# Patient Record
Sex: FEMALE | Race: WHITE | NOT HISPANIC OR LATINO | Employment: OTHER | URBAN - METROPOLITAN AREA
[De-identification: names, ages, dates, MRNs, and addresses within clinical notes are randomized per-mention and may not be internally consistent; named-entity substitution may affect disease eponyms.]

---

## 2017-03-08 ENCOUNTER — HISTORICAL (OUTPATIENT)
Dept: LAB | Facility: HOSPITAL | Age: 77
End: 2017-03-08

## 2017-04-18 ENCOUNTER — HISTORICAL (OUTPATIENT)
Dept: LAB | Facility: HOSPITAL | Age: 77
End: 2017-04-18

## 2019-12-23 ENCOUNTER — HISTORICAL (OUTPATIENT)
Dept: LAB | Facility: HOSPITAL | Age: 79
End: 2019-12-23

## 2019-12-24 LAB — GRAM STN SPEC: NORMAL

## 2021-08-16 ENCOUNTER — HISTORICAL (OUTPATIENT)
Dept: RADIOLOGY | Facility: HOSPITAL | Age: 81
End: 2021-08-16

## 2022-04-09 ENCOUNTER — HISTORICAL (OUTPATIENT)
Dept: ADMINISTRATIVE | Facility: HOSPITAL | Age: 82
End: 2022-04-09
Payer: MEDICARE

## 2022-04-25 VITALS
HEIGHT: 63 IN | BODY MASS INDEX: 27.77 KG/M2 | WEIGHT: 156.75 LBS | SYSTOLIC BLOOD PRESSURE: 90 MMHG | DIASTOLIC BLOOD PRESSURE: 54 MMHG

## 2022-08-15 ENCOUNTER — LAB REQUISITION (OUTPATIENT)
Dept: LAB | Facility: HOSPITAL | Age: 82
End: 2022-08-15
Payer: MEDICARE

## 2022-08-15 DIAGNOSIS — J32.1 CHRONIC FRONTAL SINUSITIS: ICD-10-CM

## 2022-08-15 PROCEDURE — 87070 CULTURE OTHR SPECIMN AEROBIC: CPT | Performed by: OTOLARYNGOLOGY

## 2022-08-15 PROCEDURE — 87205 SMEAR GRAM STAIN: CPT | Performed by: OTOLARYNGOLOGY

## 2022-08-16 LAB
GRAM STN SPEC: ABNORMAL

## 2022-08-18 LAB
BACTERIA SPEC CULT: ABNORMAL
BACTERIA SPEC CULT: ABNORMAL

## 2023-01-24 PROBLEM — G47.33 OSA ON CPAP: Chronic | Status: ACTIVE | Noted: 2023-01-24

## 2023-01-24 PROBLEM — G47.33 OSA ON CPAP: Status: ACTIVE | Noted: 2023-01-24

## 2023-01-24 PROBLEM — G62.9 SMALL FIBER NEUROPATHY: Status: ACTIVE | Noted: 2023-01-24

## 2023-01-24 PROBLEM — G62.9 SMALL FIBER NEUROPATHY: Chronic | Status: ACTIVE | Noted: 2023-01-24

## 2023-01-24 PROBLEM — R41.3 MEMORY LOSS: Status: ACTIVE | Noted: 2023-01-24

## 2023-01-27 PROBLEM — R41.3 MEMORY LOSS: Chronic | Status: ACTIVE | Noted: 2023-01-24

## 2023-01-27 NOTE — PROGRESS NOTES
Subjective:       Patient ID: Alysia Rivera is a 82 y.o. female.    Chief Complaint: Peripheral Neuropathy (Denies falls) and JUAN on CPAP    HPI  Juan on cpap  Needs new mask  Avg >4h/night    Memory loss:  On aricept 10/day & namenda 10mg bid  Things are about the same  Behavior is good    Small fiber neuropathy:  On gabapentin 300mg bid & cmpd cream works   She uses CMPD cream at bedtime  Things are the same  No falls    Here with her  today    Review of Systems   A 14pt ROS was reviewed & is negative unless otherwise documented in the HPI    Objective:    Physical Exam    GENERAL: NAD, calm, cooperative, appropriate  Awake/alert  Well groomed  RESP: CTAB  HEART: RRR  No LE edema  MENTAL STATUS: oriented, follow commands reliably  SPEECH/LANGUAGE: clear, fluent  CN:  Perrla, eomi, vff, gaze conjugate  No tactile or motor facial asymmetry  Tongue protrudes midline  Motor: no focal weakness  Cerebellar: no tremor or dysmetria  Sensory: normal to tactile stim/vibration  DTRs: normal +2, symmetric  Gait: steady    I, maylin obrien np, certify that dr. Ezra mendez the supervising/rendering physician is physically present in the office at the time of the visit    Assessment:       Problem List Items Addressed This Visit          Neuro    Memory loss (Chronic)    Relevant Medications    donepeziL (ARICEPT) 10 MG tablet    memantine (NAMENDA) 10 MG Tab    Small fiber neuropathy (Chronic)    Relevant Medications    gabapentin (NEURONTIN) 300 MG capsule       Other    JUAN on CPAP - Primary (Chronic)    Relevant Orders    CPAP/BIPAP SUPPLIES       Plan:       Cont aricept 10/day & namenda 10mg bid  Cont cpap; compliant/benefiting/medically necessary  Uses HMS  Cont gabapentin 300mg bid & cmpd cream  F/u 1y

## 2023-01-30 ENCOUNTER — OFFICE VISIT (OUTPATIENT)
Dept: NEUROLOGY | Facility: CLINIC | Age: 83
End: 2023-01-30
Payer: MEDICARE

## 2023-01-30 VITALS
BODY MASS INDEX: 27.46 KG/M2 | SYSTOLIC BLOOD PRESSURE: 102 MMHG | DIASTOLIC BLOOD PRESSURE: 68 MMHG | HEIGHT: 63 IN | WEIGHT: 155 LBS

## 2023-01-30 DIAGNOSIS — G47.33 OSA ON CPAP: Primary | Chronic | ICD-10-CM

## 2023-01-30 DIAGNOSIS — G62.9 SMALL FIBER NEUROPATHY: ICD-10-CM

## 2023-01-30 DIAGNOSIS — R41.3 MEMORY LOSS: ICD-10-CM

## 2023-01-30 PROCEDURE — 99214 OFFICE O/P EST MOD 30 MIN: CPT | Mod: PBBFAC | Performed by: NURSE PRACTITIONER

## 2023-01-30 PROCEDURE — 99999 PR PBB SHADOW E&M-EST. PATIENT-LVL IV: ICD-10-PCS | Mod: PBBFAC,,, | Performed by: NURSE PRACTITIONER

## 2023-01-30 PROCEDURE — 99214 PR OFFICE/OUTPT VISIT, EST, LEVL IV, 30-39 MIN: ICD-10-PCS | Mod: S$PBB,,, | Performed by: NURSE PRACTITIONER

## 2023-01-30 PROCEDURE — 99214 OFFICE O/P EST MOD 30 MIN: CPT | Mod: S$PBB,,, | Performed by: NURSE PRACTITIONER

## 2023-01-30 PROCEDURE — 99999 PR PBB SHADOW E&M-EST. PATIENT-LVL IV: CPT | Mod: PBBFAC,,, | Performed by: NURSE PRACTITIONER

## 2023-01-30 RX ORDER — FLUOXETINE HYDROCHLORIDE 20 MG/1
20 CAPSULE ORAL DAILY
COMMUNITY
Start: 2022-12-10

## 2023-01-30 RX ORDER — GABAPENTIN 300 MG/1
600 CAPSULE ORAL 2 TIMES DAILY
COMMUNITY
Start: 2022-11-08 | End: 2023-01-30 | Stop reason: SDUPTHER

## 2023-01-30 RX ORDER — DONEPEZIL HYDROCHLORIDE 10 MG/1
10 TABLET, FILM COATED ORAL DAILY
COMMUNITY
Start: 2022-11-08 | End: 2023-01-30 | Stop reason: SDUPTHER

## 2023-01-30 RX ORDER — DONEPEZIL HYDROCHLORIDE 10 MG/1
10 TABLET, FILM COATED ORAL DAILY
Qty: 90 TABLET | Refills: 4 | Status: SHIPPED | OUTPATIENT
Start: 2023-01-30 | End: 2024-01-29

## 2023-01-30 RX ORDER — MEMANTINE HYDROCHLORIDE 10 MG/1
10 TABLET ORAL 2 TIMES DAILY
COMMUNITY
Start: 2022-11-12 | End: 2023-01-30 | Stop reason: SDUPTHER

## 2023-01-30 RX ORDER — HYDROXYCHLOROQUINE SULFATE 200 MG/1
200 TABLET, FILM COATED ORAL DAILY
COMMUNITY
Start: 2022-12-06

## 2023-01-30 RX ORDER — GABAPENTIN 300 MG/1
600 CAPSULE ORAL 2 TIMES DAILY
Qty: 360 CAPSULE | Refills: 4 | Status: SHIPPED | OUTPATIENT
Start: 2023-01-30 | End: 2024-01-29 | Stop reason: SDUPTHER

## 2023-01-30 RX ORDER — TOLTERODINE TARTRATE 4 MG/1
4 CAPSULE, EXTENDED RELEASE ORAL DAILY
COMMUNITY
Start: 2023-01-13

## 2023-01-30 RX ORDER — SIMVASTATIN 40 MG/1
40 TABLET, FILM COATED ORAL NIGHTLY
COMMUNITY
Start: 2023-01-02

## 2023-01-30 RX ORDER — LEVOTHYROXINE SODIUM 75 UG/1
75 TABLET ORAL DAILY
COMMUNITY
Start: 2023-01-22

## 2023-01-30 RX ORDER — OMEPRAZOLE 40 MG/1
40 CAPSULE, DELAYED RELEASE ORAL DAILY
COMMUNITY
Start: 2023-01-25

## 2023-01-30 RX ORDER — GUAIFENESIN 1200 MG
500 TABLET, EXTENDED RELEASE 12 HR ORAL EVERY 4 HOURS PRN
COMMUNITY

## 2023-01-30 RX ORDER — MEMANTINE HYDROCHLORIDE 10 MG/1
10 TABLET ORAL 2 TIMES DAILY
Qty: 180 TABLET | Refills: 4 | Status: SHIPPED | OUTPATIENT
Start: 2023-01-30 | End: 2024-01-29 | Stop reason: SDUPTHER

## 2023-01-30 RX ORDER — PHENYLEPHRINE HCL 10 MG/1
10 TABLET, FILM COATED ORAL EVERY 4 HOURS PRN
COMMUNITY

## 2023-01-30 RX ORDER — CLOBETASOL PROPIONATE 0.46 MG/ML
1 SOLUTION TOPICAL DAILY PRN
COMMUNITY

## 2023-01-30 RX ORDER — CETIRIZINE HYDROCHLORIDE 10 MG/1
10 TABLET ORAL DAILY
COMMUNITY

## 2023-01-30 NOTE — PATIENT INSTRUCTIONS
"Patient Education       Dementia (Including Alzheimer Disease)   The Basics   Written by the doctors and editors at [facility]   What is dementia? -- Dementia is the general term for a group of brain disorders that cause memory problems and make it hard to think clearly (figure 1).  What symptoms does dementia cause? -- The symptoms of dementia often start off very mild and get worse slowly. Symptoms can include:  Forgetfulness  Acting confused or disoriented  Trouble with speech and writing (for example, not being able to find the right words for things)  Trouble concentrating and reasoning  Problems with tasks such as paying bills or balancing a checkbook  Getting lost in familiar places  As dementia gets worse, people might:  Have episodes of anger or aggression  See things that aren't there or believe things that aren't true  Be unable to eat, bathe, dress, or do other everyday tasks  Lose bladder and bowel control  What are the different kinds of dementia? -- The most common kinds include:  Alzheimer disease - Alzheimer disease is the most common cause of dementia. It is a disorder in which brain cells slowly die over time.  Vascular dementia - Vascular dementia happens when parts of the brain do not get enough blood. This can happen when blood vessels in the brain get blocked with blood clots or damaged by high blood pressure or aging. This form of dementia is most common among people who have had strokes or who are at risk for strokes.  Parkinson disease dementia - Parkinson disease is a brain disorder that affects movement. It causes trembling, stiffness, and slowness. As Parkinson disease gets worse, some people develop dementia. "Lewy body dementia" is a related form of dementia.   Other causes of dementia - Dementia can also happen if a person's brain has been damaged. For example, having many head injuries can lead to dementia.  Should I see a doctor or nurse? -- Yes, you should see a doctor or nurse if " you think you or someone close to you is showing signs of dementia. Sometimes memory loss and confusion are caused by medical problems other than dementia that can be treated. For example, people with diabetes sometimes show signs of confusion when their blood sugar is not well controlled.  Are there tests I should have? -- Your doctor or nurse will decide which tests you should have based on your individual situation. Many people with signs of dementia do not need a brain scan. That's because the tests that are most useful are the ones that look at how you answer questions and do certain tasks. Even so, your doctor might want to do a brain scan (either CT or MRI) to make sure that your symptoms are not caused by a problem unrelated to dementia.  How is dementia treated? -- That depends on what kind of dementia you have. If you have Alzheimer disease, there are medicines that might help some. If you have vascular dementia, your doctor will focus on keeping your blood pressure and cholesterol as close to normal as possible. Doing that can help reduce further damage to the brain.  Sadly, there really aren't good treatments for most types of dementia. But doctors can sometimes treat troubling symptoms that come with dementia, such as depression or anxiety.  How do I stay safe? -- If you have dementia, you might not be aware of how much your condition affects you. Trust your family and friends to tell you when it is no longer safe for you to drive, cook, or do other things that could be dangerous.  Be aware, too, that people with dementia often fall and hurt themselves. To reduce the risk of falls, it's a good idea to:  Secure loose rugs or use non-skid backing on rugs  Tuck away loose wires or electrical cords  Wear sturdy, comfortable shoes  Keep walkways well lit  Can dementia be prevented? -- There are no proven ways to prevent dementia. But here are some things that seem to help keep the brain healthy:  Physical  activity  A healthy diet  Social interaction  All topics are updated as new evidence becomes available and our peer review process is complete.  This topic retrieved from [employer] on: Sep 21, 2021.  Topic 43643 Version 15.0  Release: 29.4.2 - C29.263  © 2021 UpToDate, Inc. and/or its affiliates. All rights reserved.  figure 1: Dementia caused by changes in the brain     Different forms of dementia are linked to changes in the brain. Alzheimer disease causes many parts of the brain to shrink. Parkinson disease damages parts of the brain involved in movement. Vascular dementia happens when the blood vessels that supply the brain are diseased.  Graphic 84972 Version 2.0    Consumer Information Use and Disclaimer   This information is not specific medical advice and does not replace information you receive from your health care provider. This is only a brief summary of general information. It does NOT include all information about conditions, illnesses, injuries, tests, procedures, treatments, therapies, discharge instructions or life-style choices that may apply to you. You must talk with your health care provider for complete information about your health and treatment options. This information should not be used to decide whether or not to accept your health care provider's advice, instructions or recommendations. Only your health care provider has the knowledge and training to provide advice that is right for you. The use of this information is governed by the [employer] End User License Agreement, available at https://[email or web address].com.MeshApp/en/[email or web address]/about/hunter.The use of [employer] content is governed by the [employer] Terms of Use. ©2021 UpToDate, Inc. All rights reserved.  Copyright   © 2021 UpToDate, Inc. and/or its affiliates. All rights reserved.

## 2023-03-07 ENCOUNTER — TELEPHONE (OUTPATIENT)
Dept: NEUROLOGY | Facility: CLINIC | Age: 83
End: 2023-03-07
Payer: MEDICARE

## 2023-03-07 NOTE — TELEPHONE ENCOUNTER
States pt's prescription has  and a new one is needed.  States rx can be called or faxed in.   Fax: [phone]    Medication: Meloxicam 0.5%, Gabapentin 6 %, Lidocaine 2%, Prilocaine 2%    Pharmacy: [facility]    Last Appointment: 23    Next Appointment: 24

## 2024-01-29 ENCOUNTER — OFFICE VISIT (OUTPATIENT)
Dept: NEUROLOGY | Facility: CLINIC | Age: 84
End: 2024-01-29
Payer: MEDICARE

## 2024-01-29 VITALS
BODY MASS INDEX: 27.46 KG/M2 | WEIGHT: 155 LBS | SYSTOLIC BLOOD PRESSURE: 110 MMHG | HEIGHT: 63 IN | DIASTOLIC BLOOD PRESSURE: 60 MMHG

## 2024-01-29 DIAGNOSIS — F02.80 DEMENTIA ASSOCIATED WITH NORMAL PRESSURE HYDROCEPHALUS: Primary | ICD-10-CM

## 2024-01-29 DIAGNOSIS — R41.3 MEMORY LOSS: ICD-10-CM

## 2024-01-29 DIAGNOSIS — G91.2 DEMENTIA ASSOCIATED WITH NORMAL PRESSURE HYDROCEPHALUS: Primary | ICD-10-CM

## 2024-01-29 DIAGNOSIS — G62.9 SMALL FIBER NEUROPATHY: ICD-10-CM

## 2024-01-29 PROCEDURE — 99214 OFFICE O/P EST MOD 30 MIN: CPT | Mod: S$PBB,,, | Performed by: PSYCHIATRY & NEUROLOGY

## 2024-01-29 PROCEDURE — 99999 PR PBB SHADOW E&M-EST. PATIENT-LVL III: CPT | Mod: PBBFAC,,, | Performed by: PSYCHIATRY & NEUROLOGY

## 2024-01-29 PROCEDURE — 99213 OFFICE O/P EST LOW 20 MIN: CPT | Mod: PBBFAC | Performed by: PSYCHIATRY & NEUROLOGY

## 2024-01-29 RX ORDER — LORATADINE 10 MG
10 TABLET,DISINTEGRATING ORAL DAILY
COMMUNITY

## 2024-01-29 RX ORDER — GABAPENTIN 300 MG/1
600 CAPSULE ORAL 2 TIMES DAILY
Qty: 360 CAPSULE | Refills: 4 | Status: SHIPPED | OUTPATIENT
Start: 2024-01-29 | End: 2025-04-23

## 2024-01-29 RX ORDER — MEMANTINE HYDROCHLORIDE 10 MG/1
10 TABLET ORAL 2 TIMES DAILY
Qty: 180 TABLET | Refills: 4 | Status: SHIPPED | OUTPATIENT
Start: 2024-01-29 | End: 2025-04-23

## 2024-01-29 RX ORDER — RIVASTIGMINE 13.3 MG/24H
1 PATCH, EXTENDED RELEASE TRANSDERMAL DAILY
Qty: 90 PATCH | Refills: 4 | Status: SHIPPED | OUTPATIENT
Start: 2024-01-29

## 2024-01-29 NOTE — PROGRESS NOTES
"NEUROLOGY  OUTPATIENT OFFICE VISIT NOTE    SUBJECTIVE:      Chief Complaint: Peripheral Neuropathy, ASHKAN on CPAP, and Memory Loss       HPI: Alysia Rivera is a 83 y.o. yo female who  has a past medical history of Memory loss, Neuropathy, NPH (normal pressure hydrocephalus), and ASHKAN on CPAP., who presents for  annual follow up in neurology clinic.  Presents with her  today.    ASHKAN on CPAP:  Compliant, tolerating well.    Memory Loss:  Currently on Aricept and namenda,  reporting worsening long term memory, poor short term memory  Behavior is good  No headaches, no migrains, no syncope    Small Fiber Neuropathy:  Continues on gabapentin 600 mg BID, CMPD cream  Symptoms stable from before, having some sensation of parasthesias and cold feeling in her feet  Report no falls    Past Medical History:   has a past medical history of Memory loss, Neuropathy, NPH (normal pressure hydrocephalus), and ASHKAN on CPAP.     Past Surgical History:   has a past surgical history that includes Functional endoscopic sinus surgery (FESS); Cataract extraction w/  intraocular lens implant (Bilateral); Cholecystectomy; Hysterectomy; kyphoplasty; and Colonoscopy.     Family History:  family history includes Alzheimer's disease in her mother; Cancer in her father; Psychosis in her sister.     Social History:   reports that she has quit smoking. Her smoking use included cigarettes. She has quit using smokeless tobacco. She reports current alcohol use. She reports that she does not use drugs.     Allergies:  is allergic to omnicef [cefdinir] and pcn [penicillins].     OBJECTIVE:     Vital signs:   /60 (BP Location: Right arm, Patient Position: Sitting, BP Method: Medium (Manual))   Ht 5' 3" (1.6 m)   Wt 70.3 kg (155 lb)   BMI 27.46 kg/m²      Physical Examination:  General appearance: alert, appears stated age, cooperative and no distress  Head: Normocephalic, without obvious abnormality, atraumatic  Neck: no adenopathy, " no carotid bruit, no JVD and supple, symmetrical, trachea midline  Lungs: clear to auscultation bilaterally  Heart: regular rate and rhythm, S1, S2 normal, no murmur, click, rub or gallop  Abdomen: soft, non-tender; bowel sounds normal; no masses,  no organomegaly  Extremities: extremities normal, atraumatic, no cyanosis or edema  Pulses: 2+ and symmetric  Skin: Skin color, texture, turgor normal. No rashes or lesions  Neurologic:    CN II: Intact visual acuity as well as pupillary light reflex   CN III, IV, VI: Intact extraocular movements B/L   CN V: Facial sensation intact to gross and fine touch in B/L V1-V3 segments   CN VII: Patient able to grimace, tightly clenched eyes, puff out cheeks against resistance   CN VIII: Normal hearing bilaterally, no tinnitus/vertigo reported    CN IX & X: Gag reflex deferred; Uvula midline   CN XI: Patient able to shrug shoulders and turn head to both sides normally   CN XII: Intact glossal movements as well as protrusion   Cerebellar: no dysdiadochokinesia, N rapid alternating movements, N finger-to-nose test; Romberg test & Tandem Walking     Gait: stable gait      ASSESSMENT & PLAN:      Small Fiber Neuropathy  - continue Gabapentin 600 mg BID   - CMPD cream  - symptoms stable from before    Memory Loss  - Chronic with some worstening this visit  - continue Namenda 10 mg  - will discontinue from Aricept 10 mg, begin rivastigmine patch q24 hours    ASHKAN on CPAP  - compliant with CPAP, good effect  - continue        Return to clinic in 1 year(s).    Reshma Allison DO  [facility], [facility]  01/29/2024    Orders Placed This Encounter    rivastigmine 13.3 mg/24 hour PT24    memantine (NAMENDA) 10 MG Tab    gabapentin (NEURONTIN) 300 MG capsule

## 2024-05-20 ENCOUNTER — TELEPHONE (OUTPATIENT)
Dept: NEUROLOGY | Facility: CLINIC | Age: 84
End: 2024-05-20
Payer: MEDICARE

## 2024-05-20 DIAGNOSIS — G47.33 OSA ON CPAP: Primary | ICD-10-CM

## 2024-06-10 ENCOUNTER — TELEPHONE (OUTPATIENT)
Dept: NEUROLOGY | Facility: CLINIC | Age: 84
End: 2024-06-10
Payer: MEDICARE

## 2025-01-27 ENCOUNTER — OFFICE VISIT (OUTPATIENT)
Dept: NEUROLOGY | Facility: CLINIC | Age: 85
End: 2025-01-27
Payer: MEDICARE

## 2025-01-27 VITALS
HEIGHT: 63 IN | DIASTOLIC BLOOD PRESSURE: 64 MMHG | SYSTOLIC BLOOD PRESSURE: 110 MMHG | BODY MASS INDEX: 26.58 KG/M2 | WEIGHT: 150 LBS

## 2025-01-27 DIAGNOSIS — G62.9 SMALL FIBER NEUROPATHY: ICD-10-CM

## 2025-01-27 DIAGNOSIS — F02.80 DEMENTIA ASSOCIATED WITH NORMAL PRESSURE HYDROCEPHALUS: ICD-10-CM

## 2025-01-27 DIAGNOSIS — G91.2 DEMENTIA ASSOCIATED WITH NORMAL PRESSURE HYDROCEPHALUS: ICD-10-CM

## 2025-01-27 DIAGNOSIS — R41.3 MEMORY LOSS: ICD-10-CM

## 2025-01-27 PROCEDURE — 99213 OFFICE O/P EST LOW 20 MIN: CPT | Mod: PBBFAC | Performed by: PSYCHIATRY & NEUROLOGY

## 2025-01-27 PROCEDURE — 99999 PR PBB SHADOW E&M-EST. PATIENT-LVL III: CPT | Mod: PBBFAC,,, | Performed by: PSYCHIATRY & NEUROLOGY

## 2025-01-27 PROCEDURE — 99214 OFFICE O/P EST MOD 30 MIN: CPT | Mod: S$PBB,,, | Performed by: PSYCHIATRY & NEUROLOGY

## 2025-01-27 RX ORDER — MEMANTINE HYDROCHLORIDE 10 MG/1
10 TABLET ORAL 2 TIMES DAILY
Qty: 180 TABLET | Refills: 4 | Status: SHIPPED | OUTPATIENT
Start: 2025-01-27 | End: 2026-04-22

## 2025-01-27 RX ORDER — RIVASTIGMINE 13.3 MG/24H
1 PATCH, EXTENDED RELEASE TRANSDERMAL DAILY
Qty: 90 PATCH | Refills: 4 | Status: SHIPPED | OUTPATIENT
Start: 2025-01-27

## 2025-01-27 RX ORDER — DONEPEZIL HYDROCHLORIDE 10 MG/1
10 TABLET, FILM COATED ORAL NIGHTLY
COMMUNITY
End: 2025-01-27

## 2025-01-27 RX ORDER — GABAPENTIN 300 MG/1
600 CAPSULE ORAL 3 TIMES DAILY
Qty: 540 CAPSULE | Refills: 4 | Status: SHIPPED | OUTPATIENT
Start: 2025-01-27 | End: 2026-04-22

## 2025-01-27 RX ORDER — FAMOTIDINE 20 MG/1
20 TABLET, FILM COATED ORAL 2 TIMES DAILY
COMMUNITY

## 2025-01-27 RX ORDER — LINACLOTIDE 72 UG/1
72 CAPSULE, GELATIN COATED ORAL EVERY MORNING
COMMUNITY
Start: 2025-01-02

## 2025-01-27 NOTE — PROGRESS NOTES
NEUROLOGY  OUTPATIENT OFFICE VISIT NOTE    SUBJECTIVE:      Chief Complaint: Peripheral Neuropathy, Memory Loss, and ASHKAN on CPAP       HPI: Alysia Rivera is a 84 y.o. yo female who  has a past medical history of Memory loss, Neuropathy, NPH (normal pressure hydrocephalus), and ASHKAN on CPAP., who presents for  annual follow up in neurology clinic.  ***        Past Medical History:   has a past medical history of Memory loss, Neuropathy, NPH (normal pressure hydrocephalus), and ASHKAN on CPAP.     Past Surgical History:   has a past surgical history that includes Functional endoscopic sinus surgery (FESS); Cataract extraction w/  intraocular lens implant (Bilateral); Cholecystectomy; Hysterectomy; kyphoplasty; and Colonoscopy.     Family History:  family history includes Alzheimer's disease in her mother; Cancer in her father; Psychosis in her sister.     Social History:   reports that she has quit smoking. Her smoking use included cigarettes. She has quit using smokeless tobacco. She reports current alcohol use. She reports that she does not use drugs.     Allergies:  is allergic to omnicef [cefdinir] and pcn [penicillins].     OBJECTIVE:     Vital signs:   There were no vitals taken for this visit.     Physical Examination:  General appearance: alert, appears stated age, cooperative and no distress  Head: Normocephalic, without obvious abnormality, atraumatic  Neck: no adenopathy, no carotid bruit, no JVD and supple, symmetrical, trachea midline  Lungs: clear to auscultation bilaterally  Heart: regular rate and rhythm, S1, S2 normal, no murmur, click, rub or gallop  Abdomen: soft, non-tender; bowel sounds normal; no masses,  no organomegaly  Extremities: extremities normal, atraumatic, no cyanosis or edema  Pulses: 2+ and symmetric  Skin: Skin color, texture, turgor normal. No rashes or lesions  Neurologic:      ASSESSMENT & PLAN:      Small Fiber Neuropathy  - continue Gabapentin 600 mg BID   - CMPD cream  -  symptoms stable from before    Memory Loss  - Chronic with some worstening this visit  - continue Namenda 10 mg  - will discontinue from Aricept 10 mg, begin rivastigmine patch q24 hours    ASHKAN on CPAP  - compliant with CPAP, good effect  - continue      Return to clinic in *** year(s).    Tony Chin MD  [facility], [facility]  01/27/2025

## 2025-01-27 NOTE — PROGRESS NOTES
"  NEUROLOGY  OUTPATIENT OFFICE VISIT NOTE    SUBJECTIVE:      Chief Complaint: Peripheral Neuropathy, Memory Loss, and ASHKAN on CPAP       HPI: Alysia Rivera is a 84 y.o. yo female who  has a past medical history of Memory loss, Neuropathy, NPH (normal pressure hydrocephalus), and ASHKAN on CPAP., who presents for  annual follow up in neurology clinic.  Presents with her  today. Patient has been well since her last appointment. She reports some mild worsening of her lower extremity neuropathy with pain and cold sensation despite treatment though it does not limit her mobility. She has fallen once at home but otherwise uses walker at home for mobility.  reports that patient's memory has slightly worsened though reports compliance with medication therapy. They reports compliance with treatment with rarely missed doses.       Past Medical History:   has a past medical history of Memory loss, Neuropathy, NPH (normal pressure hydrocephalus), and ASHKAN on CPAP.     Past Surgical History:   has a past surgical history that includes Functional endoscopic sinus surgery (FESS); Cataract extraction w/  intraocular lens implant (Bilateral); Cholecystectomy; Hysterectomy; kyphoplasty; and Colonoscopy.     Family History:  family history includes Alzheimer's disease in her mother; Cancer in her father; Psychosis in her sister.     Social History:   reports that she has quit smoking. Her smoking use included cigarettes. She has quit using smokeless tobacco. She reports current alcohol use. She reports that she does not use drugs.     Allergies:  is allergic to omnicef [cefdinir] and pcn [penicillins].     OBJECTIVE:     Vital signs:   /64 (BP Location: Left arm, Patient Position: Sitting)   Ht 5' 3" (1.6 m)   Wt 68 kg (150 lb)   BMI 26.57 kg/m²      Physical Examination:  General appearance: alert, appears stated age, cooperative and no distress  Head: Normocephalic, without obvious abnormality, " atraumatic  Neck: no adenopathy, no carotid bruit, no JVD and supple, symmetrical, trachea midline  Lungs: clear to auscultation bilaterally  Heart: regular rate and rhythm, S1, S2 normal, no murmur, click, rub or gallop  Abdomen: soft, non-tender; bowel sounds normal; no masses,  no organomegaly  Extremities: extremities normal, atraumatic, no cyanosis or edema  Pulses: 2+ and symmetric  Skin: Skin color, texture, turgor normal. No rashes or lesions  Neurologic: bilateral lower extremity pain      ASSESSMENT & PLAN:      Small Fiber Neuropathy  - increase Gabapentin 600 mg TID   - CMPD cream  - follow up in 3 months; consider adding Cymbalta if no improvement in pain    Memory Loss  - Chronic with some worstening this visit  - continue Namenda 10 mg  - continue rivastigmine patch q24 hours; discontinued Aricept    ASHKAN on CPAP  - compliant with CPAP, good effect  - continue use        Return to clinic in 3 months.    Tony Chin MD  [facility]  01/27/2025    Orders Placed This Encounter    gabapentin (NEURONTIN) 300 MG capsule    memantine (NAMENDA) 10 MG Tab    rivastigmine 13.3 mg/24 hour PT24

## 2025-05-06 ENCOUNTER — OFFICE VISIT (OUTPATIENT)
Dept: NEUROLOGY | Facility: CLINIC | Age: 85
End: 2025-05-06
Payer: MEDICARE

## 2025-05-06 VITALS
SYSTOLIC BLOOD PRESSURE: 92 MMHG | BODY MASS INDEX: 26.58 KG/M2 | DIASTOLIC BLOOD PRESSURE: 66 MMHG | WEIGHT: 150 LBS | HEIGHT: 63 IN

## 2025-05-06 DIAGNOSIS — G62.9 SMALL FIBER NEUROPATHY: ICD-10-CM

## 2025-05-06 DIAGNOSIS — F02.80 DEMENTIA ASSOCIATED WITH NORMAL PRESSURE HYDROCEPHALUS: ICD-10-CM

## 2025-05-06 DIAGNOSIS — G91.2 DEMENTIA ASSOCIATED WITH NORMAL PRESSURE HYDROCEPHALUS: ICD-10-CM

## 2025-05-06 DIAGNOSIS — G47.33 OSA ON CPAP: Primary | ICD-10-CM

## 2025-05-06 PROCEDURE — 99214 OFFICE O/P EST MOD 30 MIN: CPT | Mod: S$PBB,,, | Performed by: PSYCHIATRY & NEUROLOGY

## 2025-05-06 PROCEDURE — 99214 OFFICE O/P EST MOD 30 MIN: CPT | Mod: PBBFAC | Performed by: PSYCHIATRY & NEUROLOGY

## 2025-05-06 PROCEDURE — 99999 PR PBB SHADOW E&M-EST. PATIENT-LVL IV: CPT | Mod: PBBFAC,,, | Performed by: PSYCHIATRY & NEUROLOGY

## 2025-05-06 RX ORDER — NITROFURANTOIN 25; 75 MG/1; MG/1
100 CAPSULE ORAL EVERY 12 HOURS
COMMUNITY
Start: 2025-05-02

## 2025-05-06 NOTE — PROGRESS NOTES
Subjective     Patient ID: Alysia Rivera is a 84 y.o. female.    Chief Complaint: Peripheral Neuropathy    HPI  Memory progressing; on exelon and memantine  Neuorpathy; better with gabapentin increase last time; c/o constant feet being cold  Also uses compound pain cream  Juan: compliant/benefitting from cpap  Review of Systems  The remainder of the 14 system ROS is noncontributory or negative unless mentioned/reviewed above.       Objective     Physical Exam  A.nfex       Assessment and Plan     1. JUAN on CPAP  -     CPAP/BIPAP SUPPLIES    2. 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

## 2025-06-05 DIAGNOSIS — G91.2 DEMENTIA ASSOCIATED WITH NORMAL PRESSURE HYDROCEPHALUS: Primary | ICD-10-CM

## 2025-06-05 DIAGNOSIS — F02.80 DEMENTIA ASSOCIATED WITH NORMAL PRESSURE HYDROCEPHALUS: Primary | ICD-10-CM

## 2025-06-06 RX ORDER — RIVASTIGMINE 13.3 MG/24H
1 PATCH, EXTENDED RELEASE TRANSDERMAL DAILY
Qty: 90 PATCH | Refills: 2 | Status: SHIPPED | OUTPATIENT
Start: 2025-06-06

## 2025-07-05 DIAGNOSIS — R41.3 MEMORY LOSS: ICD-10-CM

## 2025-07-07 RX ORDER — MEMANTINE HYDROCHLORIDE 10 MG/1
10 TABLET ORAL 2 TIMES DAILY
Qty: 180 TABLET | Refills: 4 | Status: SHIPPED | OUTPATIENT
Start: 2025-07-07